# Patient Record
Sex: FEMALE | Race: WHITE | URBAN - METROPOLITAN AREA
[De-identification: names, ages, dates, MRNs, and addresses within clinical notes are randomized per-mention and may not be internally consistent; named-entity substitution may affect disease eponyms.]

---

## 2021-03-11 ENCOUNTER — OFFICE VISIT (OUTPATIENT)
Dept: PRIMARY CARE CLINIC | Age: 26
End: 2021-03-11
Payer: COMMERCIAL

## 2021-03-11 DIAGNOSIS — Z01.818 PREOP TESTING: Primary | ICD-10-CM

## 2021-03-11 DIAGNOSIS — Z11.59 SCREENING FOR VIRAL DISEASE: ICD-10-CM

## 2021-03-11 PROCEDURE — 99211 OFF/OP EST MAY X REQ PHY/QHP: CPT | Performed by: NURSE PRACTITIONER

## 2021-03-12 LAB — SARS-COV-2: NOT DETECTED

## 2021-03-18 ENCOUNTER — HOSPITAL ENCOUNTER (INPATIENT)
Age: 26
LOS: 2 days | Discharge: HOME | End: 2021-03-20
Attending: OBSTETRICS & GYNECOLOGY | Admitting: OBSTETRICS & GYNECOLOGY
Payer: COMMERCIAL

## 2021-03-18 PROBLEM — Z37.9 NORMAL LABOR: Status: ACTIVE | Noted: 2021-03-18

## 2021-03-18 PROBLEM — B95.1 GROUP BETA STREP POSITIVE: Status: ACTIVE | Noted: 2021-03-18

## 2021-03-18 LAB
ABO/RH: NORMAL
AMPHETAMINE SCREEN, URINE: NORMAL
ANTIBODY SCREEN: NORMAL
BARBITURATE SCREEN URINE: NORMAL
BASOPHILS ABSOLUTE: 0.1 K/UL (ref 0–0.2)
BASOPHILS RELATIVE PERCENT: 0.4 %
BENZODIAZEPINE SCREEN, URINE: NORMAL
BUPRENORPHINE URINE: NORMAL
CANNABINOID SCREEN URINE: NORMAL
COCAINE METABOLITE SCREEN URINE: NORMAL
EOSINOPHILS ABSOLUTE: 0 K/UL (ref 0–0.6)
EOSINOPHILS RELATIVE PERCENT: 0.1 %
HCT VFR BLD CALC: 40.5 % (ref 36–48)
HEMOGLOBIN: 13.8 G/DL (ref 12–16)
LYMPHOCYTES ABSOLUTE: 1.8 K/UL (ref 1–5.1)
LYMPHOCYTES RELATIVE PERCENT: 15 %
Lab: NORMAL
MCH RBC QN AUTO: 28.8 PG (ref 26–34)
MCHC RBC AUTO-ENTMCNC: 34.1 G/DL (ref 31–36)
MCV RBC AUTO: 84.4 FL (ref 80–100)
METHADONE SCREEN, URINE: NORMAL
MONOCYTES ABSOLUTE: 0.7 K/UL (ref 0–1.3)
MONOCYTES RELATIVE PERCENT: 5.3 %
NEUTROPHILS ABSOLUTE: 9.7 K/UL (ref 1.7–7.7)
NEUTROPHILS RELATIVE PERCENT: 79.2 %
OPIATE SCREEN URINE: NORMAL
OXYCODONE URINE: NORMAL
PDW BLD-RTO: 14.6 % (ref 12.4–15.4)
PH UA: 6
PHENCYCLIDINE SCREEN URINE: NORMAL
PLATELET # BLD: 191 K/UL (ref 135–450)
PMV BLD AUTO: 10.1 FL (ref 5–10.5)
PROPOXYPHENE SCREEN: NORMAL
RBC # BLD: 4.8 M/UL (ref 4–5.2)
WBC # BLD: 12.3 K/UL (ref 4–11)

## 2021-03-18 PROCEDURE — 2580000003 HC RX 258: Performed by: ADVANCED PRACTICE MIDWIFE

## 2021-03-18 PROCEDURE — 86850 RBC ANTIBODY SCREEN: CPT

## 2021-03-18 PROCEDURE — 0DQR0ZZ REPAIR ANAL SPHINCTER, OPEN APPROACH: ICD-10-PCS | Performed by: OBSTETRICS & GYNECOLOGY

## 2021-03-18 PROCEDURE — 86900 BLOOD TYPING SEROLOGIC ABO: CPT

## 2021-03-18 PROCEDURE — 80307 DRUG TEST PRSMV CHEM ANLYZR: CPT

## 2021-03-18 PROCEDURE — 1220000000 HC SEMI PRIVATE OB R&B

## 2021-03-18 PROCEDURE — 85025 COMPLETE CBC W/AUTO DIFF WBC: CPT

## 2021-03-18 PROCEDURE — 6370000000 HC RX 637 (ALT 250 FOR IP): Performed by: ADVANCED PRACTICE MIDWIFE

## 2021-03-18 PROCEDURE — 86901 BLOOD TYPING SEROLOGIC RH(D): CPT

## 2021-03-18 PROCEDURE — 7200000001 HC VAGINAL DELIVERY

## 2021-03-18 PROCEDURE — 86780 TREPONEMA PALLIDUM: CPT

## 2021-03-18 PROCEDURE — 6360000002 HC RX W HCPCS: Performed by: ADVANCED PRACTICE MIDWIFE

## 2021-03-18 RX ORDER — SODIUM CHLORIDE 0.9 % (FLUSH) 0.9 %
10 SYRINGE (ML) INJECTION EVERY 12 HOURS SCHEDULED
Status: DISCONTINUED | OUTPATIENT
Start: 2021-03-18 | End: 2021-03-18

## 2021-03-18 RX ORDER — DOCUSATE SODIUM 100 MG/1
100 CAPSULE, LIQUID FILLED ORAL 2 TIMES DAILY
Status: DISCONTINUED | OUTPATIENT
Start: 2021-03-18 | End: 2021-03-20 | Stop reason: HOSPADM

## 2021-03-18 RX ORDER — SODIUM CHLORIDE 0.9 % (FLUSH) 0.9 %
10 SYRINGE (ML) INJECTION PRN
Status: DISCONTINUED | OUTPATIENT
Start: 2021-03-18 | End: 2021-03-20 | Stop reason: HOSPADM

## 2021-03-18 RX ORDER — FERROUS SULFATE 325(65) MG
325 TABLET ORAL 2 TIMES DAILY WITH MEALS
Status: DISCONTINUED | OUTPATIENT
Start: 2021-03-18 | End: 2021-03-20 | Stop reason: HOSPADM

## 2021-03-18 RX ORDER — SODIUM CHLORIDE 0.9 % (FLUSH) 0.9 %
10 SYRINGE (ML) INJECTION EVERY 12 HOURS SCHEDULED
Status: DISCONTINUED | OUTPATIENT
Start: 2021-03-18 | End: 2021-03-20 | Stop reason: HOSPADM

## 2021-03-18 RX ORDER — ONDANSETRON 2 MG/ML
4 INJECTION INTRAMUSCULAR; INTRAVENOUS EVERY 6 HOURS PRN
Status: DISCONTINUED | OUTPATIENT
Start: 2021-03-18 | End: 2021-03-20 | Stop reason: HOSPADM

## 2021-03-18 RX ORDER — HYDROCODONE BITARTRATE AND ACETAMINOPHEN 5; 325 MG/1; MG/1
1 TABLET ORAL EVERY 4 HOURS PRN
Status: DISCONTINUED | OUTPATIENT
Start: 2021-03-18 | End: 2021-03-20 | Stop reason: HOSPADM

## 2021-03-18 RX ORDER — LIDOCAINE HYDROCHLORIDE 10 MG/ML
INJECTION, SOLUTION INFILTRATION; PERINEURAL
Status: DISCONTINUED
Start: 2021-03-18 | End: 2021-03-18

## 2021-03-18 RX ORDER — IBUPROFEN 800 MG/1
800 TABLET ORAL EVERY 6 HOURS PRN
Status: DISCONTINUED | OUTPATIENT
Start: 2021-03-18 | End: 2021-03-20 | Stop reason: HOSPADM

## 2021-03-18 RX ORDER — LANOLIN 100 %
OINTMENT (GRAM) TOPICAL PRN
Status: DISCONTINUED | OUTPATIENT
Start: 2021-03-18 | End: 2021-03-20 | Stop reason: HOSPADM

## 2021-03-18 RX ORDER — SODIUM CHLORIDE, SODIUM LACTATE, POTASSIUM CHLORIDE, CALCIUM CHLORIDE 600; 310; 30; 20 MG/100ML; MG/100ML; MG/100ML; MG/100ML
INJECTION, SOLUTION INTRAVENOUS CONTINUOUS
Status: DISCONTINUED | OUTPATIENT
Start: 2021-03-18 | End: 2021-03-20 | Stop reason: HOSPADM

## 2021-03-18 RX ORDER — ACETAMINOPHEN 325 MG/1
650 TABLET ORAL EVERY 4 HOURS PRN
Status: DISCONTINUED | OUTPATIENT
Start: 2021-03-18 | End: 2021-03-20 | Stop reason: HOSPADM

## 2021-03-18 RX ORDER — CALCIUM CARBONATE 500(1250)
500 TABLET ORAL DAILY
COMMUNITY

## 2021-03-18 RX ORDER — PENICILLIN G POTASSIUM 5000000 [IU]/1
INJECTION, POWDER, FOR SOLUTION INTRAMUSCULAR; INTRAVENOUS
Status: DISCONTINUED
Start: 2021-03-18 | End: 2021-03-18

## 2021-03-18 RX ADMIN — DEXTROSE MONOHYDRATE 5 MILLION UNITS: 5 INJECTION INTRAVENOUS at 10:20

## 2021-03-18 RX ADMIN — IBUPROFEN 800 MG: 800 TABLET, FILM COATED ORAL at 20:00

## 2021-03-18 RX ADMIN — WITCH HAZEL 40 EACH: 500 SOLUTION RECTAL; TOPICAL at 19:56

## 2021-03-18 RX ADMIN — Medication 10 ML: at 19:57

## 2021-03-18 RX ADMIN — BENZOCAINE AND LEVOMENTHOL: 200; 5 SPRAY TOPICAL at 19:56

## 2021-03-18 RX ADMIN — Medication 2.5 MILLION UNITS: at 13:54

## 2021-03-18 RX ADMIN — SODIUM CHLORIDE, POTASSIUM CHLORIDE, SODIUM LACTATE AND CALCIUM CHLORIDE: 600; 310; 30; 20 INJECTION, SOLUTION INTRAVENOUS at 10:10

## 2021-03-18 RX ADMIN — DOCUSATE SODIUM 100 MG: 100 CAPSULE, LIQUID FILLED ORAL at 19:56

## 2021-03-18 NOTE — H&P
Department of Obstetrics and Gynecology   Obstetrics History and Physical        CHIEF COMPLAINT:  Admitted w/ onset of labor. Reports onset of regular contractions at 0430. Plans NCB.  at bedside. Denies fevers, SOB, coughs, known Covid exposures. Covid testing Negative on 3/11/2021    HISTORY OF PRESENT ILLNESS:      The patient is a 27 y.o. female at 39w0d. OB History        1    Para        Term                AB        Living           SAB        TAB        Ectopic        Molar        Multiple        Live Births                Patient presents with a chief complaint as above and is being admitted for active phase labor    Estimated Due Date: Estimated Date of Delivery: 3/25/21    PRENATAL CARE:    Complicated by: none    PAST OB HISTORY:  OB History        1    Para        Term                AB        Living           SAB        TAB        Ectopic        Molar        Multiple        Live Births                    Past Medical History:        Diagnosis Date    Asthma     childhood     Past Surgical History:        Procedure Laterality Date    KNEE SURGERY Left     TONSILLECTOMY      WISDOM TOOTH EXTRACTION       Allergies:  Patient has no known allergies.     Social History:    Social History     Socioeconomic History    Marital status:      Spouse name: Not on file    Number of children: Not on file    Years of education: Not on file    Highest education level: Not on file   Occupational History    Not on file   Social Needs    Financial resource strain: Not on file    Food insecurity     Worry: Not on file     Inability: Not on file   [employer] needs     Medical: Not on file     Non-medical: Not on file   Tobacco Use    Smoking status: Never Smoker    Smokeless tobacco: Never Used   Substance and Sexual Activity    Alcohol use: Not Currently    Drug use: Never    Sexual activity: Yes     Partners: Male   Lifestyle    Physical activity     Days per week: Not on file     Minutes per session: Not on file    Stress: Not on file   Relationships    Social connections     Talks on phone: Not on file     Gets together: Not on file     Attends Hindu service: Not on file     Active member of club or organization: Not on file     Attends meetings of clubs or organizations: Not on file     Relationship status: Not on file    Intimate partner violence     Fear of current or ex partner: Not on file     Emotionally abused: Not on file     Physically abused: Not on file     Forced sexual activity: Not on file   Other Topics Concern    Not on file   Social History Narrative    Not on file     Family History:   History reviewed. No pertinent family history. Medications Prior to Admission:  Medications Prior to Admission: Prenatal Vit-Fe Fumarate-FA (PRENATAL 1+1 PO), Take 1 tablet by mouth daily  calcium carbonate (OSCAL) 500 MG TABS tablet, Take 500 mg by mouth daily    REVIEW OF SYSTEMS:    As per HPI    PHYSICAL EXAM:  Vitals:    03/18/21 0734   BP: 133/79   Pulse: 86   Resp: 16   Temp: 98.2 °F (36.8 °C)   TempSrc: Infrared   Weight: 184 lb (83.5 kg)   Height: 5' 5\" (1.651 m)     General appearance:  awake, alert, cooperative, no apparent distress, and appears stated age  Neurologic:  Awake, alert, oriented to name, place and time. Lungs:  No increased work of breathing, good air exchange  Abdomen:  Soft, non tender, gravid, consistent with her gestational age, EFW by Leopold's maneuver was 7#   Fetal heart rate:  Reassuring.  130 moderate variability, Accels present  Pelvis:  Adequate pelvis  Cervix: 4/70/-2 on admit, 5-6/100/-1 at 0930 per CNM exam  Contraction frequency:  q 2-3 minutes  Membranes:  Intact/Bulging    Labs:   CBC with Differential:    Lab Results   Component Value Date    WBC 12.3 03/18/2021    RBC 4.80 03/18/2021    HGB 13.8 03/18/2021    HCT 40.5 03/18/2021     03/18/2021    MCV 84.4 03/18/2021    MCH 28.8 03/18/2021    MCHC 34.1

## 2021-03-18 NOTE — PLAN OF CARE
Problem: Pain:  Goal: Pain level will decrease  Description: Pain level will decrease  3/18/2021 1931 by Charisse Reyes RN  Outcome: Completed  3/18/2021 1117 by Charisse Reyes RN  Outcome: Ongoing  Goal: Control of acute pain  Description: Control of acute pain  3/18/2021 1931 by Charisse Reyes RN  Outcome: Completed  3/18/2021 1117 by Charisse Reyes RN  Outcome: Ongoing  Goal: Control of chronic pain  Description: Control of chronic pain  3/18/2021 1931 by Charisse Reyes RN  Outcome: Completed  3/18/2021 1117 by Charisse Reyes RN  Outcome: Ongoing     Problem: Anxiety:  Goal: Level of anxiety will decrease  Description: Level of anxiety will decrease  3/18/2021 1931 by Charisse Reyes RN  Outcome: Completed  3/18/2021 1117 by Charisse Reyes RN  Outcome: Ongoing     Problem: Breathing Pattern - Ineffective:  Goal: Able to breathe comfortably  Description: Able to breathe comfortably  3/18/2021 1931 by Charisse Reyes RN  Outcome: Completed  3/18/2021 1117 by Charisse Reyes RN  Outcome: Ongoing     Problem: Fluid Volume - Imbalance:  Goal: Absence of imbalanced fluid volume signs and symptoms  Description: Absence of imbalanced fluid volume signs and symptoms  3/18/2021 1931 by Charisse Reyes RN  Outcome: Completed  3/18/2021 1117 by Charisse Reyes RN  Outcome: Ongoing  Goal: Absence of intrapartum hemorrhage signs and symptoms  Description: Absence of intrapartum hemorrhage signs and symptoms  3/18/2021 1931 by Charisse Reyes RN  Outcome: Completed  3/18/2021 1117 by Charisse Reyes RN  Outcome: Ongoing     Problem: Infection - Intrapartum Infection:  Goal: Will show no infection signs and symptoms  Description: Will show no infection signs and symptoms  3/18/2021 1931 by Charisse Reyes RN  Outcome: Completed  3/18/2021 1117 by Charisse Reyes RN  Outcome: Ongoing     Problem: Labor Process - Prolonged:  Goal: Labor progression, first stage, within specified pattern  Description: Labor progression, first stage, within specified pattern  3/18/2021 1931 by Tho Garcia RN  Outcome: Completed  3/18/2021 111 by Tho Garcia RN  Outcome: Ongoing  Goal: Labor progession, second stage, within specified pattern  Description: Labor progession, second stage, within specified pattern  3/18/2021 1931 by Tho Garcia RN  Outcome: Completed  3/18/2021 111 by Tho Garcia RN  Outcome: Ongoing  Goal: Uterine contractions within specified parameters  Description: Uterine contractions within specified parameters  3/18/2021 1931 by Tho Garcia RN  Outcome: Completed  3/18/2021 111 by Tho Garcia RN  Outcome: Ongoing     Problem:  Screening:  Goal: Ability to make informed decisions regarding treatment has improved  Description: Ability to make informed decisions regarding treatment has improved  3/18/2021 1931 by Tho Garcia RN  Outcome: Completed  3/18/2021 1117 by Tho Garcia RN  Outcome: Ongoing     Problem: Pain - Acute:  Goal: Pain level will decrease  Description: Pain level will decrease  3/18/2021 1931 by Tho Garcia RN  Outcome: Completed  3/18/2021 111 by Tho Garcia RN  Outcome: Ongoing  Goal: Able to cope with pain  Description: Able to cope with pain  3/18/2021 1931 by Tho Garcia RN  Outcome: Completed  3/18/2021 1117 by Tho Garcia RN  Outcome: Ongoing     Problem: Tissue Perfusion - Uteroplacental, Altered:  Goal: Absence of abnormal fetal heart rate pattern  Description: Absence of abnormal fetal heart rate pattern  3/18/2021 1931 by Tho Garcia RN  Outcome: Completed  3/18/2021 111 by Tho Garcia RN  Outcome: Ongoing     Problem: Urinary Retention:  Goal: Experiences of bladder distention will decrease  Description: Experiences of bladder distention will decrease  3/18/2021 1931 by Tho Garcia RN  Outcome: Completed  3/18/2021 111 by Annalisa Shannon Bonny oLndon RN  Outcome: Ongoing  Goal: Urinary elimination within specified parameters  Description: Urinary elimination within specified parameters  3/18/2021 1931 by Dorina Moran RN  Outcome: Completed  3/18/2021 1117 by Dorina Moran RN  Outcome: Ongoing

## 2021-03-19 LAB
BASOPHILS ABSOLUTE: 0 K/UL (ref 0–0.2)
BASOPHILS RELATIVE PERCENT: 0.1 %
EOSINOPHILS ABSOLUTE: 0 K/UL (ref 0–0.6)
EOSINOPHILS RELATIVE PERCENT: 0 %
HCT VFR BLD CALC: 38.3 % (ref 36–48)
HEMOGLOBIN: 12.8 G/DL (ref 12–16)
LYMPHOCYTES ABSOLUTE: 1.7 K/UL (ref 1–5.1)
LYMPHOCYTES RELATIVE PERCENT: 11.5 %
MCH RBC QN AUTO: 28.7 PG (ref 26–34)
MCHC RBC AUTO-ENTMCNC: 33.3 G/DL (ref 31–36)
MCV RBC AUTO: 86.2 FL (ref 80–100)
MONOCYTES ABSOLUTE: 1.2 K/UL (ref 0–1.3)
MONOCYTES RELATIVE PERCENT: 7.7 %
NEUTROPHILS ABSOLUTE: 12.2 K/UL (ref 1.7–7.7)
NEUTROPHILS RELATIVE PERCENT: 80.7 %
PDW BLD-RTO: 14.3 % (ref 12.4–15.4)
PLATELET # BLD: 177 K/UL (ref 135–450)
PMV BLD AUTO: 9.9 FL (ref 5–10.5)
RBC # BLD: 4.45 M/UL (ref 4–5.2)
TOTAL SYPHILLIS IGG/IGM: NORMAL
WBC # BLD: 15.2 K/UL (ref 4–11)

## 2021-03-19 PROCEDURE — 36415 COLL VENOUS BLD VENIPUNCTURE: CPT

## 2021-03-19 PROCEDURE — 1220000000 HC SEMI PRIVATE OB R&B

## 2021-03-19 PROCEDURE — 6370000000 HC RX 637 (ALT 250 FOR IP): Performed by: ADVANCED PRACTICE MIDWIFE

## 2021-03-19 PROCEDURE — 85025 COMPLETE CBC W/AUTO DIFF WBC: CPT

## 2021-03-19 RX ADMIN — ACETAMINOPHEN 650 MG: 325 TABLET ORAL at 08:39

## 2021-03-19 RX ADMIN — IBUPROFEN 800 MG: 800 TABLET, FILM COATED ORAL at 14:02

## 2021-03-19 RX ADMIN — IBUPROFEN 800 MG: 800 TABLET, FILM COATED ORAL at 20:45

## 2021-03-19 RX ADMIN — ACETAMINOPHEN 650 MG: 325 TABLET ORAL at 18:40

## 2021-03-19 RX ADMIN — DOCUSATE SODIUM 100 MG: 100 CAPSULE, LIQUID FILLED ORAL at 08:39

## 2021-03-19 RX ADMIN — ACETAMINOPHEN 650 MG: 325 TABLET ORAL at 14:01

## 2021-03-19 RX ADMIN — IBUPROFEN 800 MG: 800 TABLET, FILM COATED ORAL at 06:21

## 2021-03-19 ASSESSMENT — PAIN SCALES - GENERAL
PAINLEVEL_OUTOF10: 2
PAINLEVEL_OUTOF10: 1

## 2021-03-19 NOTE — PLAN OF CARE
RN  Outcome: Ongoing     Problem: Breathing Pattern - Ineffective:  Goal: Able to breathe comfortably  Description: Able to breathe comfortably  3/18/2021 1931 by Phil Regalado RN  Outcome: Completed  3/18/2021 1117 by Phil Regalado RN  Outcome: Ongoing     Problem: Fluid Volume - Imbalance:  Goal: Absence of imbalanced fluid volume signs and symptoms  Description: Absence of imbalanced fluid volume signs and symptoms  3/18/2021 1931 by Phil Regalado RN  Outcome: Completed  3/18/2021 1117 by Phil Regalado RN  Outcome: Ongoing  Goal: Absence of intrapartum hemorrhage signs and symptoms  Description: Absence of intrapartum hemorrhage signs and symptoms  3/18/2021 1931 by Phil Regalado RN  Outcome: Completed  3/18/2021 1117 by Phil Regalado RN  Outcome: Ongoing     Problem: Infection - Intrapartum Infection:  Goal: Will show no infection signs and symptoms  Description: Will show no infection signs and symptoms  3/18/2021 1931 by Phil Regalado RN  Outcome: Completed  3/18/2021 1117 by Phil Regalado RN  Outcome: Ongoing     Problem: Labor Process - Prolonged:  Goal: Labor progression, first stage, within specified pattern  Description: Labor progression, first stage, within specified pattern  3/18/2021 1931 by Phil Regalado RN  Outcome: Completed  3/18/2021 1117 by Phil Regalado RN  Outcome: Ongoing  Goal: Labor progession, second stage, within specified pattern  Description: Labor progession, second stage, within specified pattern  3/18/2021 1931 by Phil Regalado RN  Outcome: Completed  3/18/2021 1117 by Phil Regalado RN  Outcome: Ongoing  Goal: Uterine contractions within specified parameters  Description: Uterine contractions within specified parameters  3/18/2021 1931 by Phil Regalado RN  Outcome: Completed  3/18/2021 1117 by Phil Regalado RN  Outcome: Ongoing     Problem:  Screening:  Goal: Ability to make informed decisions regarding treatment has improved  Description: Ability to make informed decisions regarding treatment has improved  3/18/2021 1931 by Pamella Cole RN  Outcome: Completed  3/18/2021 1117 by Pamella Cole RN  Outcome: Ongoing     Problem: Pain - Acute:  Goal: Pain level will decrease  Description: Pain level will decrease  3/18/2021 1931 by Pamella Cole RN  Outcome: Completed  3/18/2021 1117 by Pamella Cole RN  Outcome: Ongoing  Goal: Able to cope with pain  Description: Able to cope with pain  3/18/2021 1931 by Pamella Cole RN  Outcome: Completed  3/18/2021 1117 by Pamella Cole RN  Outcome: Ongoing     Problem: Tissue Perfusion - Uteroplacental, Altered:  Goal: Absence of abnormal fetal heart rate pattern  Description: Absence of abnormal fetal heart rate pattern  3/18/2021 1931 by Pamella Cole RN  Outcome: Completed  3/18/2021 1117 by Pamella Cole RN  Outcome: Ongoing     Problem: Urinary Retention:  Goal: Experiences of bladder distention will decrease  Description: Experiences of bladder distention will decrease  3/18/2021 1931 by Pamella Cole RN  Outcome: Completed  3/18/2021 1117 by Pamella Cole RN  Outcome: Ongoing  Goal: Urinary elimination within specified parameters  Description: Urinary elimination within specified parameters  3/18/2021 1931 by Pamella Cole RN  Outcome: Completed  3/18/2021 1117 by Pamella Cole RN  Outcome: Ongoing

## 2021-03-19 NOTE — PLAN OF CARE
Problem: VAGINAL DELIVERY - RECOVERY AND POST PARTUM  Goal: Vital signs are medically acceptable  3/19/2021 0742 by Catherine Caldwell RN  Outcome: Ongoing  3/19/2021 0000 by Delfino Melara RN  Outcome: Ongoing  Goal: Patient will remain free of falls  3/19/2021 0742 by Catherine Caldwell RN  Outcome: Ongoing  3/19/2021 0000 by Delfino Melara RN  Outcome: Ongoing  Goal: Fundus firm at midline  3/19/2021 0742 by Catherine Caldwell RN  Outcome: Ongoing  3/19/2021 0000 by Delfino Melara RN  Outcome: Ongoing  Goal: Moderate rubra without clots, no purulent discharge, no foul smelling lochia  3/19/2021 0742 by Catherine Caldwell RN  Outcome: Ongoing  3/19/2021 0000 by Delfino Melara RN  Outcome: Ongoing  Goal: Empties bladder  3/19/2021 0742 by Catherine Caldwell RN  Outcome: Ongoing  3/19/2021 0000 by Delfino Melara RN  Outcome: Ongoing  Goal: Verbalizes understanding of normal bowel function resumption  3/19/2021 0742 by Catherine Caldwell RN  Outcome: Ongoing  3/19/2021 0000 by Delfino Melara RN  Outcome: Ongoing  Goal: Edema will be absent or minimal  3/19/2021 0742 by Catherine Caldwell RN  Outcome: Ongoing  3/19/2021 0000 by Delfino Melara RN  Outcome: Ongoing  Goal: Breasts are soft with nipple integrity intact  3/19/2021 0742 by Catherine Caldwell RN  Outcome: Ongoing  3/19/2021 0000 by Delfino Melara RN  Outcome: Ongoing  Goal: Demonstrates appropriate breast feeding techniques  3/19/2021 0742 by Catherine Caldwell RN  Outcome: Ongoing  3/19/2021 0000 by Delfino Melara RN  Outcome: Ongoing  Goal: Appropriate behavior observed  3/19/2021 0742 by Catherine Caldwell RN  Outcome: Ongoing  3/19/2021 0000 by Delfino Melara RN  Outcome: Ongoing  Goal: Positive Mother-Baby interactions are observed  3/19/2021 0742 by Catherine Caldwell RN  Outcome: Ongoing  3/19/2021 0000 by Delfino Melara RN  Outcome: Ongoing  Goal: Perineum intact without discharge or hematoma  3/19/2021 0742 by Catherine Caldwell, RN  Outcome: Ongoing  3/19/2021 0000 by Shiloh Hardin RN  Outcome: Ongoing  Goal: Ambulates independently  3/19/2021 8675 by Barbi Shay RN  Outcome: Ongoing  3/19/2021 0000 by Shiloh Hardin RN  Outcome: Ongoing     Problem: PAIN  Goal: Patient's pain/discomfort is manageable  3/19/2021 0742 by Barbi Shay RN  Outcome: Ongoing  3/19/2021 0000 by Shiloh Hardin RN  Outcome: Ongoing     Problem: KNOWLEDGE DEFICIT  Goal: Patient/S.O. demonstrates understanding of disease process, treatment plan, medications, and discharge instructions.   3/19/2021 0742 by Barbi Shay RN  Outcome: Ongoing  3/19/2021 0000 by Shiloh Hardin RN  Outcome: Ongoing

## 2021-03-19 NOTE — L&D DELIVERY SUMMARY NOTE
[address] [address]                            LABOR AND DELIVERY NOTE    PATIENT NAME: Sonia Malone                       :        1995  MED REC NO:   [id number]                          ROOM:       [id number]  ACCOUNT NO:   [de-identified]                           ADMIT DATE: 2021  PROVIDER:     Damaso Krause CNM    DATE OF PROCEDURE:  2021    DELIVERY SUMMARY    The patient is a 17-year-old  1, para 0, at 44 weeks gestation  who was admitted on 2021 in active labor at term. On admission, she was 4 cm dilated, 70% effaced, -2 station with a  cephalic presentation. Her fetal heart tones were category I.  Her  pregnancy course was uncomplicated. She was GBS positive and did  receive two doses of antibiotics prior to delivery. The patient desired  natural childbirth. She labored effectively, became complete and started to push. Membranes  ruptured spontaneously as the patient became complete for a moderate  amount of clear fluid. She pushed for approximately 1.5 hours to   and the rest of the infant delivered atraumatically and was  placed on the mother's abdomen. The infant established good color, tone  and cry at delivery. The cord was doubly clamped and cut and the infant was placed  skin-to-skin with the mother and there was a posterior compound  presenting hand. The placenta delivered spontaneously and was inspected  and found to be intact. The uterus contracted promptly and the patient  did receive oxytocin and the IV fluids. EBL was 250 mL. The perineum and vagina were explored and a third-degree laceration was  noted. Dr. Rosie Chandra was called to the bedside and performed the repair  with 2-0 and 3-0 suture under local infiltration in the usual fashion. See separate MD note for repair. This mother plans to breastfeed her baby.   Mother, baby and father  entered

## 2021-03-19 NOTE — LACTATION NOTE
Lactation Progress Note      Data:  F/U on 1/0 breast feeder. Mob states that baby is feeding well. States that latch is uncomfortable initially but better after about 1 minute. States that baby just fed. Action: Breast feeding education reviewed. Stressed importance of always achieving a good deep latch and offered LC support as needed. [facility] number on board for f/u. Response: Verbalized understanding and comfortable with breast feeding at this time.

## 2021-03-19 NOTE — PROGRESS NOTES
Breathing well with contractions, second dose of antibiotics in  Afebrile,VSS   per doppler  Contractions q 2 minutes  Cx C/C/0 SROM w/ clear fluid at exam  Continue plan of care  Begin pushing   Anticipate vaginal delivery
Breathing well with contractions. Up walking in room  Afebrile, VSS  -134 per intermittent ascultation.  No decelerations during ascultation  Contractions q 2 minutes, palpate firm  Cx deferred  Continue plan of care  Labor support  Plan cx exam after second dose of antibiotics or prn
Called by BEN Elliott to evaluate laceration  Noted to have Third degree laceration  Local lidocaine injected   2 O and 3 O Vicryl (x2) used to repair   Post repair, rectal exam performed. No suture palpated, rectal mucosa intact.
Department of Obstetrics and Gynecology  Labor and Delivery  Attending Post Partum Progress Note      SUBJECTIVE:  Feeling well, ambulating w/out problems. Voiding and bleeding WNL. Pain well-controlled w/PO Motrin. Breastfeeding infant male. Desires circ.     OBJECTIVE:      Vitals:  /61   Pulse 77   Temp 97.7 °F (36.5 °C) (Axillary)   Resp 18   Ht 5' 5\" (1.651 m)   Wt 184 lb (83.5 kg)   Breastfeeding Unknown   BMI 30.62 kg/m²       DATA:    CBC:    Lab Results   Component Value Date    WBC 15.2 03/19/2021    RBC 4.45 03/19/2021    HGB 12.8 03/19/2021    HCT 38.3 03/19/2021    MCV 86.2 03/19/2021    RDW 14.3 03/19/2021     03/19/2021       ASSESSMENT & PLAN:      Normal PP day 1  Lactating  Stable infant male    Comfort and teaching  Continue PP care  Probable D/C tomorrow
Per order from [address] FHR monitoring allowed to be intermittent with Doppler.
Pt assisted by 2 staff members to restroom for first time get up. Pt denies dizziness or lightheadedness. Gait steady. Pt voided urine without difficulty, unable to measure. Pericare done by pt with RN instruction. New ice pack, pad and panties put on pt. Gown changed. Hand hygiene done. Complete linen change done and comfort pad put on bed. Pt tolerated well.
Viable male infant delivered via spontaneous vaginal delivery natural childbirth, performed by Chloe Sosa CNM at this time
4

## 2021-03-20 VITALS
SYSTOLIC BLOOD PRESSURE: 110 MMHG | HEART RATE: 77 BPM | RESPIRATION RATE: 18 BRPM | WEIGHT: 184 LBS | DIASTOLIC BLOOD PRESSURE: 70 MMHG | HEIGHT: 65 IN | BODY MASS INDEX: 30.66 KG/M2 | TEMPERATURE: 97.8 F

## 2021-03-20 PROBLEM — Z37.9 NORMAL LABOR: Status: RESOLVED | Noted: 2021-03-18 | Resolved: 2021-03-20

## 2021-03-20 PROBLEM — B95.1 GROUP BETA STREP POSITIVE: Status: RESOLVED | Noted: 2021-03-18 | Resolved: 2021-03-20

## 2021-03-20 PROBLEM — Z37.9 NORMAL LABOR: Status: ACTIVE | Noted: 2021-03-20

## 2021-03-20 PROCEDURE — 6370000000 HC RX 637 (ALT 250 FOR IP): Performed by: ADVANCED PRACTICE MIDWIFE

## 2021-03-20 RX ORDER — IBUPROFEN 800 MG/1
800 TABLET ORAL EVERY 6 HOURS PRN
Qty: 120 TABLET | Refills: 3 | COMMUNITY
Start: 2021-03-20

## 2021-03-20 RX ORDER — LANOLIN 100 %
OINTMENT (GRAM) TOPICAL
Qty: 1 TUBE | Refills: 3 | COMMUNITY
Start: 2021-03-20

## 2021-03-20 RX ORDER — PSEUDOEPHEDRINE HCL 30 MG
100 TABLET ORAL 2 TIMES DAILY
COMMUNITY
Start: 2021-03-20

## 2021-03-20 RX ADMIN — DOCUSATE SODIUM 100 MG: 100 CAPSULE, LIQUID FILLED ORAL at 07:40

## 2021-03-20 RX ADMIN — DOCUSATE SODIUM 100 MG: 100 CAPSULE, LIQUID FILLED ORAL at 14:53

## 2021-03-20 RX ADMIN — ACETAMINOPHEN 650 MG: 325 TABLET ORAL at 15:20

## 2021-03-20 RX ADMIN — ACETAMINOPHEN 650 MG: 325 TABLET ORAL at 07:41

## 2021-03-20 RX ADMIN — ACETAMINOPHEN 650 MG: 325 TABLET ORAL at 03:08

## 2021-03-20 RX ADMIN — IBUPROFEN 800 MG: 800 TABLET, FILM COATED ORAL at 14:52

## 2021-03-20 ASSESSMENT — PAIN SCALES - GENERAL
PAINLEVEL_OUTOF10: 2

## 2021-03-20 NOTE — FLOWSHEET NOTE
Patient ambulatory to room 382 for admission for labor. Patient and family oriented to room. Call light explained and provided. EFM applied with consent to central monitor bank with alarms on. Uterus soft and non-tender. Admission history obtained, laboratory results reviewed and appropriate consents signed/reviewed. Reviewed  safety and security, initial care of the  and medications given at delivery. Questions and concerns addressed/answered.
Postpartum and infant care teaching completed and forms signed by patient. Copy witnessed by RN and given to patient who verbalized understanding of all teaching points and denies further questions. Patient plans to follow-up with Lisa Padilla Provider as instructed. ID bands checked. Mother's ID band and one of baby's ID bands removed and taped to discharge instruction sheet, signed by patient and witnessed by RN. Patient discharged home in stable condition accompanied by the fob. Discharged via wheelchair, holding baby in a rear facing car seat.
__________________________  13. Do you have any other questions or concerns I can address today?  Y/N  __________________________________________________      Teaching During interview :_____________________________________________  ___________________________RN       Date:______________Time:________________

## 2021-03-20 NOTE — LACTATION NOTE
This note was copied from a baby's chart. Introduced self as lactation RN for this shift. Name and number written on board. MOB states that her left nipple has started to feel sore during entire feed and not just the first 30 seconds to a minute. Denies any blisters or redness but she did notice that her nipple was flattened when baby came off that side last feeding. MOB plans to call when infant is due to eat again to assess nipple and latch. Will follow up.

## 2021-03-20 NOTE — LACTATION NOTE
This note was copied from a baby's chart. Lactation Progress Note      Data:     F/u during lactation rounds with primip breast feeder, 2 po who desires to be discharged home later today. C/o sore nipples. Infant at 6.4% weight loss. Action: Discussed possible causes of sore nipples including shallow latch. Educated on importance of ARUNA, and how a good latch should look and feel. Encouraged to call for [facility] to assess latch with next feeding and will review discharge breast feeding education at this as well. Educated on inpatient and outpatient lactation support services available, and how to contact. Response: Verbalized understanding and plans to call for [address] [facility] [place] to assess latch and review discharge teaching before discharge home.

## 2023-10-02 LAB
C. TRACHOMATIS, EXTERNAL RESULT: NEGATIVE
N. GONORRHOEAE, EXTERNAL RESULT: NEGATIVE

## 2023-10-07 LAB
ABO, EXTERNAL RESULT: NORMAL
HEP B, EXTERNAL RESULT: NEGATIVE
HIV, EXTERNAL RESULT: NEGATIVE
RH FACTOR, EXTERNAL RESULT: POSITIVE
RPR, EXTERNAL RESULT: NON REACTIVE
RUBELLA TITER, EXTERNAL RESULT: NORMAL

## 2023-10-11 LAB — GBS, EXTERNAL RESULT: POSITIVE

## 2024-05-04 ENCOUNTER — HOSPITAL ENCOUNTER (INPATIENT)
Age: 29
LOS: 2 days | Discharge: HOME | End: 2024-05-06
Attending: OBSTETRICS & GYNECOLOGY | Admitting: OBSTETRICS & GYNECOLOGY
Payer: COMMERCIAL

## 2024-05-04 LAB
ABO + RH BLD: NORMAL
AMPHETAMINES UR QL SCN>1000 NG/ML: NORMAL
BARBITURATES UR QL SCN>200 NG/ML: NORMAL
BASOPHILS # BLD: 0.1 K/UL (ref 0–0.2)
BASOPHILS NFR BLD: 0.5 %
BENZODIAZ UR QL SCN>200 NG/ML: NORMAL
BLD GP AB SCN SERPL QL: NORMAL
BUPRENORPHINE+NOR UR QL SCN: NORMAL
CANNABINOIDS UR QL SCN>50 NG/ML: NORMAL
COCAINE UR QL SCN: NORMAL
DEPRECATED RDW RBC AUTO: 14.9 % (ref 12.4–15.4)
DRUG SCREEN COMMENT UR-IMP: NORMAL
EOSINOPHIL # BLD: 0 K/UL (ref 0–0.6)
EOSINOPHIL NFR BLD: 0.2 %
FENTANYL SCREEN, URINE: NORMAL
HCT VFR BLD AUTO: 35.2 % (ref 36–48)
HGB BLD-MCNC: 11.8 G/DL (ref 12–16)
LYMPHOCYTES # BLD: 1.9 K/UL (ref 1–5.1)
LYMPHOCYTES NFR BLD: 19.2 %
MCH RBC QN AUTO: 27.3 PG (ref 26–34)
MCHC RBC AUTO-ENTMCNC: 33.7 G/DL (ref 31–36)
MCV RBC AUTO: 80.9 FL (ref 80–100)
METHADONE UR QL SCN>300 NG/ML: NORMAL
MONOCYTES # BLD: 0.5 K/UL (ref 0–1.3)
MONOCYTES NFR BLD: 5.2 %
NEUTROPHILS # BLD: 7.6 K/UL (ref 1.7–7.7)
OPIATES UR QL SCN>300 NG/ML: NORMAL
OXYCODONE UR QL SCN: NORMAL
PCP UR QL SCN>25 NG/ML: NORMAL
PH UR STRIP: 6 [PH]
PLATELET # BLD AUTO: 184 K/UL (ref 135–450)
PMV BLD AUTO: 9.7 FL (ref 5–10.5)
RBC # BLD AUTO: 4.35 M/UL (ref 4–5.2)
WBC # BLD AUTO: 10.1 K/UL (ref 4–11)

## 2024-05-04 PROCEDURE — 86901 BLOOD TYPING SEROLOGIC RH(D): CPT

## 2024-05-04 PROCEDURE — 80307 DRUG TEST PRSMV CHEM ANLYZR: CPT

## 2024-05-04 PROCEDURE — 86780 TREPONEMA PALLIDUM: CPT

## 2024-05-04 PROCEDURE — 85025 COMPLETE CBC W/AUTO DIFF WBC: CPT

## 2024-05-04 PROCEDURE — 86900 BLOOD TYPING SEROLOGIC ABO: CPT

## 2024-05-04 PROCEDURE — 6360000002 HC RX W HCPCS: Performed by: ADVANCED PRACTICE MIDWIFE

## 2024-05-04 PROCEDURE — 86850 RBC ANTIBODY SCREEN: CPT

## 2024-05-04 PROCEDURE — 1220000000 HC SEMI PRIVATE OB R&B

## 2024-05-04 PROCEDURE — 2580000003 HC RX 258: Performed by: ADVANCED PRACTICE MIDWIFE

## 2024-05-04 RX ORDER — SODIUM CHLORIDE 0.9 % (FLUSH) 0.9 %
5-40 SYRINGE (ML) INJECTION EVERY 12 HOURS SCHEDULED
Status: DISCONTINUED | OUTPATIENT
Start: 2024-05-04 | End: 2024-05-05

## 2024-05-04 RX ORDER — SODIUM CHLORIDE, SODIUM LACTATE, POTASSIUM CHLORIDE, AND CALCIUM CHLORIDE .6; .31; .03; .02 G/100ML; G/100ML; G/100ML; G/100ML
500 INJECTION, SOLUTION INTRAVENOUS PRN
Status: DISCONTINUED | OUTPATIENT
Start: 2024-05-04 | End: 2024-05-05

## 2024-05-04 RX ORDER — SODIUM CHLORIDE 9 MG/ML
25 INJECTION, SOLUTION INTRAVENOUS PRN
Status: DISCONTINUED | OUTPATIENT
Start: 2024-05-04 | End: 2024-05-05

## 2024-05-04 RX ORDER — SODIUM CHLORIDE, SODIUM LACTATE, POTASSIUM CHLORIDE, CALCIUM CHLORIDE 600; 310; 30; 20 MG/100ML; MG/100ML; MG/100ML; MG/100ML
INJECTION, SOLUTION INTRAVENOUS CONTINUOUS
Status: DISCONTINUED | OUTPATIENT
Start: 2024-05-04 | End: 2024-05-05

## 2024-05-04 RX ORDER — ONDANSETRON 2 MG/ML
4 INJECTION INTRAMUSCULAR; INTRAVENOUS EVERY 6 HOURS PRN
Status: DISCONTINUED | OUTPATIENT
Start: 2024-05-04 | End: 2024-05-05

## 2024-05-04 RX ORDER — SODIUM CHLORIDE, SODIUM LACTATE, POTASSIUM CHLORIDE, AND CALCIUM CHLORIDE .6; .31; .03; .02 G/100ML; G/100ML; G/100ML; G/100ML
1000 INJECTION, SOLUTION INTRAVENOUS PRN
Status: DISCONTINUED | OUTPATIENT
Start: 2024-05-04 | End: 2024-05-05

## 2024-05-04 RX ORDER — CARBOPROST TROMETHAMINE 250 UG/ML
250 INJECTION, SOLUTION INTRAMUSCULAR PRN
Status: DISCONTINUED | OUTPATIENT
Start: 2024-05-04 | End: 2024-05-05

## 2024-05-04 RX ORDER — ONDANSETRON 4 MG/1
4 TABLET, ORALLY DISINTEGRATING ORAL EVERY 6 HOURS PRN
Status: DISCONTINUED | OUTPATIENT
Start: 2024-05-04 | End: 2024-05-05

## 2024-05-04 RX ORDER — MISOPROSTOL 100 UG/1
400 TABLET ORAL PRN
Status: DISCONTINUED | OUTPATIENT
Start: 2024-05-04 | End: 2024-05-05

## 2024-05-04 RX ORDER — ACETAMINOPHEN 325 MG/1
650 TABLET ORAL EVERY 4 HOURS PRN
Status: DISCONTINUED | OUTPATIENT
Start: 2024-05-04 | End: 2024-05-05

## 2024-05-04 RX ORDER — SODIUM CHLORIDE, SODIUM LACTATE, POTASSIUM CHLORIDE, CALCIUM CHLORIDE 600; 310; 30; 20 MG/100ML; MG/100ML; MG/100ML; MG/100ML
INJECTION, SOLUTION INTRAVENOUS CONTINUOUS
Status: DISCONTINUED | OUTPATIENT
Start: 2024-05-04 | End: 2024-05-06 | Stop reason: HOSPADM

## 2024-05-04 RX ORDER — METHYLERGONOVINE MALEATE 0.2 MG/ML
200 INJECTION INTRAVENOUS PRN
Status: DISCONTINUED | OUTPATIENT
Start: 2024-05-04 | End: 2024-05-05

## 2024-05-04 RX ORDER — TERBUTALINE SULFATE 1 MG/ML
0.25 INJECTION, SOLUTION SUBCUTANEOUS
Status: DISCONTINUED | OUTPATIENT
Start: 2024-05-04 | End: 2024-05-05

## 2024-05-04 RX ORDER — SODIUM CHLORIDE 0.9 % (FLUSH) 0.9 %
5-40 SYRINGE (ML) INJECTION PRN
Status: DISCONTINUED | OUTPATIENT
Start: 2024-05-04 | End: 2024-05-05

## 2024-05-04 RX ADMIN — SODIUM CHLORIDE, POTASSIUM CHLORIDE, SODIUM LACTATE AND CALCIUM CHLORIDE: 600; 310; 30; 20 INJECTION, SOLUTION INTRAVENOUS at 23:51

## 2024-05-04 RX ADMIN — DEXTROSE MONOHYDRATE 5 MILLION UNITS: 5 INJECTION INTRAVENOUS at 20:35

## 2024-05-04 RX ADMIN — Medication 2.5 MILLION UNITS: at 23:53

## 2024-05-04 RX ADMIN — SODIUM CHLORIDE, POTASSIUM CHLORIDE, SODIUM LACTATE AND CALCIUM CHLORIDE: 600; 310; 30; 20 INJECTION, SOLUTION INTRAVENOUS at 20:35

## 2024-05-05 LAB — REAGIN+T PALLIDUM IGG+IGM SERPL-IMP: NORMAL

## 2024-05-05 PROCEDURE — 0KQM0ZZ REPAIR PERINEUM MUSCLE, OPEN APPROACH: ICD-10-PCS | Performed by: ADVANCED PRACTICE MIDWIFE

## 2024-05-05 PROCEDURE — 6360000002 HC RX W HCPCS: Performed by: ADVANCED PRACTICE MIDWIFE

## 2024-05-05 PROCEDURE — 1220000000 HC SEMI PRIVATE OB R&B

## 2024-05-05 PROCEDURE — 10907ZC DRAINAGE OF AMNIOTIC FLUID, THERAPEUTIC FROM PRODUCTS OF CONCEPTION, VIA NATURAL OR ARTIFICIAL OPENING: ICD-10-PCS | Performed by: ADVANCED PRACTICE MIDWIFE

## 2024-05-05 PROCEDURE — 2500000003 HC RX 250 WO HCPCS

## 2024-05-05 PROCEDURE — 7200000001 HC VAGINAL DELIVERY

## 2024-05-05 PROCEDURE — 6370000000 HC RX 637 (ALT 250 FOR IP): Performed by: ADVANCED PRACTICE MIDWIFE

## 2024-05-05 RX ORDER — LIDOCAINE HYDROCHLORIDE 10 MG/ML
INJECTION, SOLUTION INFILTRATION; PERINEURAL
Status: COMPLETED
Start: 2024-05-05 | End: 2024-05-05

## 2024-05-05 RX ORDER — IBUPROFEN 800 MG/1
800 TABLET ORAL EVERY 8 HOURS SCHEDULED
Status: DISCONTINUED | OUTPATIENT
Start: 2024-05-05 | End: 2024-05-06 | Stop reason: HOSPADM

## 2024-05-05 RX ORDER — SODIUM CHLORIDE 0.9 % (FLUSH) 0.9 %
5-40 SYRINGE (ML) INJECTION PRN
Status: DISCONTINUED | OUTPATIENT
Start: 2024-05-05 | End: 2024-05-06 | Stop reason: HOSPADM

## 2024-05-05 RX ORDER — KETOROLAC TROMETHAMINE 30 MG/ML
30 INJECTION, SOLUTION INTRAMUSCULAR; INTRAVENOUS ONCE
Status: COMPLETED | OUTPATIENT
Start: 2024-05-05 | End: 2024-05-05

## 2024-05-05 RX ORDER — LANOLIN 100 %
OINTMENT (GRAM) TOPICAL PRN
Status: DISCONTINUED | OUTPATIENT
Start: 2024-05-05 | End: 2024-05-06 | Stop reason: HOSPADM

## 2024-05-05 RX ORDER — DOCUSATE SODIUM 100 MG/1
100 CAPSULE, LIQUID FILLED ORAL 2 TIMES DAILY
Status: DISCONTINUED | OUTPATIENT
Start: 2024-05-05 | End: 2024-05-06 | Stop reason: HOSPADM

## 2024-05-05 RX ORDER — SODIUM CHLORIDE 0.9 % (FLUSH) 0.9 %
5-40 SYRINGE (ML) INJECTION EVERY 12 HOURS SCHEDULED
Status: DISCONTINUED | OUTPATIENT
Start: 2024-05-05 | End: 2024-05-06 | Stop reason: HOSPADM

## 2024-05-05 RX ORDER — FERROUS SULFATE 325(65) MG
325 TABLET ORAL EVERY OTHER DAY
Status: DISCONTINUED | OUTPATIENT
Start: 2024-05-05 | End: 2024-05-06 | Stop reason: HOSPADM

## 2024-05-05 RX ORDER — KETOROLAC TROMETHAMINE 30 MG/ML
30 INJECTION, SOLUTION INTRAMUSCULAR; INTRAVENOUS ONCE
Status: DISCONTINUED | OUTPATIENT
Start: 2024-05-05 | End: 2024-05-05

## 2024-05-05 RX ORDER — SODIUM CHLORIDE 9 MG/ML
INJECTION, SOLUTION INTRAVENOUS PRN
Status: DISCONTINUED | OUTPATIENT
Start: 2024-05-05 | End: 2024-05-06 | Stop reason: HOSPADM

## 2024-05-05 RX ORDER — ACETAMINOPHEN 500 MG
1000 TABLET ORAL EVERY 8 HOURS SCHEDULED
Status: DISCONTINUED | OUTPATIENT
Start: 2024-05-05 | End: 2024-05-06 | Stop reason: HOSPADM

## 2024-05-05 RX ADMIN — IBUPROFEN 800 MG: 800 TABLET, FILM COATED ORAL at 13:32

## 2024-05-05 RX ADMIN — DOCUSATE SODIUM 100 MG: 100 CAPSULE, LIQUID FILLED ORAL at 21:07

## 2024-05-05 RX ADMIN — Medication 166.7 ML: at 01:32

## 2024-05-05 RX ADMIN — ACETAMINOPHEN 1000 MG: 500 TABLET ORAL at 08:24

## 2024-05-05 RX ADMIN — Medication 87.3 MILLI-UNITS/MIN: at 01:32

## 2024-05-05 RX ADMIN — DOCUSATE SODIUM 100 MG: 100 CAPSULE, LIQUID FILLED ORAL at 08:24

## 2024-05-05 RX ADMIN — KETOROLAC TROMETHAMINE 30 MG: 30 INJECTION, SOLUTION INTRAMUSCULAR at 02:24

## 2024-05-05 RX ADMIN — LIDOCAINE HYDROCHLORIDE 200 MG: 10 INJECTION, SOLUTION INFILTRATION; PERINEURAL at 01:50

## 2024-05-05 ASSESSMENT — PAIN SCALES - GENERAL: PAINLEVEL_OUTOF10: 3

## 2024-05-05 NOTE — L&D DELIVERY SUMMARY NOTE
[address] [address]                         LABOR AND DELIVERY NOTE      PATIENT NAME: ERLINDA RUSSELL                 : 1995  MED REC NO: [id number]                      ROOM: 0382  ACCOUNT NO: [id number]                       ADMIT DATE: 2024  PROVIDER: Ashly Elliott CNM      DATE OF PROCEDURE:      The patient is a 29-year-old,  2, para 1, at 39 weeks and 4 days' gestation who was admitted on 2024 in active labor at term.  Her pregnancy course was uncomplicated.  Her GBS status was positive, and she did receive 2 doses of antibiotics prior to delivery.  On admission, she was 6 to 7 cm dilated, 80% effaced, -2 station with a cephalic presentation.  Fetal heart tones were category 1 initially and then the patient desired intermittent fetal monitoring and the fetal heart rate remained reassuring.  She made good labor progress.  She did receive the 2 doses of antibiotics and then requested rupture of membranes.  Membranes were ruptured artificially, and the patient was 8 cm for thin meconium fluid.  She progressed very quickly to complete, pushed for approximately 15 minutes, and had a spontaneous vaginal delivery of a live born infant male.  The patient was in the all-fours position at delivery.  The cord was doubly clamped and cut after the infant was delivered, and the infant was placed skin to skin with the mother.  The baby established good color, tone, and cry with drying.  The cord blood was collected and the delivery of the placenta was then spontaneous.  The uterus contracted promptly, and the patient did receive oxytocin and IV fluids.  The perineum and vagina were inspected and found to have a second-degree laceration, which was repaired with 2-0 and 3-0 suture under local infiltration.  Mother, baby, and father entered into the postpartum period in excellent condition.  Estimated blood loss was

## 2024-05-05 NOTE — PROGRESS NOTES
Patient up for the first time with the help of 2 RNs. Assist x 2 provided; patient tolerated well. Linens were changed, pericare performed, panties applied and Pad changed. Patient voided without difficulty. Patient was helped back to bed without difficulty. Patient is pink and stable

## 2024-05-05 NOTE — L&D DELIVERY SUMMARY NOTE
Department of Obstetrics and Gynecology  Spontaneous Vaginal Delivery Note         Pre-operative Diagnosis:  Term pregnancy, Spontaneous labor, and Uncomplicated pregnancy    Post-operative Diagnosis:  Living  infant(s) and Male    Procedure:  Spontaneous vaginal delivery or Repair second degree spontaneous laceration    Surgeon:   Ashly Elliott CNM    Information for the patient's :  Miguel Camejo [[id number]]        Anesthesia:  none    Estimated blood loss:  200 cc    Specimen:  Placenta not sent to pathology     Cord blood sent Yes    Complications:  none    Condition:  infant stable and skin to skin with the mother and mother stable    Details of Procedure:   The patient is a 29 y.o. female at 39w5d   OB History          2    Para   1    Term   1            AB        Living   1         SAB        IAB        Ectopic        Molar        Multiple   0    Live Births   1             who was admitted for active phase labor. She received the following interventions: ARBOW with thin meconium fluid return.  She was known to be GBS positive and did receive antibiotic prophylaxis x 2 doses prior to delivery. The patient progressed well,did not receive an epidural, became complete and started to push. After pushing for 15 minutes the fetal head was at the perineum and the rest of the infant delivered atraumatically and was placed on the mother's abdomen.Cord was clamped and cut and infant placed skin to skin with the mother.  The delivery of the placenta was spontaneous. The perineum and vagina were explored and a second degree laceration was repaired in standard fashion under local infiltration. This mother plans to breastfeed her baby. Dr Zendejas was notified of this birth.

## 2024-05-05 NOTE — PROGRESS NOTES
Department of Obstetrics and Gynecology  Labor and Delivery  Attending Post Partum Progress Note      SUBJECTIVE:  Feels well, denies complaints. Voiding qs, tolerating regular diet, ambulating well. Minimal perineal discomfort. Baby is breast feeding well    OBJECTIVE:      Vitals:  /66   Pulse 73   Temp 98.1 °F (36.7 °C) (Oral)   Resp 16   Ht 1.651 m (5' 5\")   Wt 83.9 kg (185 lb)   SpO2 97%   Breastfeeding Unknown   BMI 30.79 kg/m²     ABDOMEN:  soft and non-tender, FF@U  GENITAL/URINARY:  MLR  LE: No evidence of DVT    DATA:    Pending for tomorrow    ASSESSMENT & PLAN:      Multip s/p vaginal delivery #DOD  Stable nursing male infant  Continue plan of care  Lactation support prn

## 2024-05-05 NOTE — PLAN OF CARE
Problem: Vaginal Birth or  Section  Goal: Fetal and maternal status remain reassuring during the birth process  Description:  Birth OB-Pregnancy care plan goal which identifies if the fetal and maternal status remain reassuring during the birth process  2024 0152 by Margo Alves, RN  Outcome: Completed  2024 by Margo Alves, RN  Outcome: Progressing

## 2024-05-05 NOTE — PROGRESS NOTES
Called GODFREY DevriesM to update that patient is in triage with chief complaint of contractions. SVE was 6-7/80/-2. Admit orders given at this time. CNM on the way to hospital.

## 2024-05-05 NOTE — H&P
Department of Obstetrics and Gynecology   Obstetrics History and Physical        CHIEF COMPLAINT:  Admitted w/ onset of labor. Reports onset of regular contractions this evening. Denies LOF. Admits to bloody show. Baby is active. Plans NCB.     HISTORY OF PRESENT ILLNESS:      The patient is a 29 y.o. female at 39.4 weeks  OB History          2    Para   1    Term   1            AB        Living   1         SAB        IAB        Ectopic        Molar        Multiple   0    Live Births   1            Patient presents with a chief complaint as above and is being admitted for active phase labor    Estimated Due Date: Estimated Date of Delivery: 2024    PRENATAL CARE:    Complicated by: None.  MaterniT-21 WNL XY, TSF=002. TWG=10#, Hx of anxiety due to job stress. 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      PAST OB HISTORY:  OB History          2    Para   1    Term   1            AB        Living   1         SAB        IAB        Ectopic        Molar        Multiple   0    Live Births   1                Past Medical History:        Diagnosis Date    Asthma     childhood     Past Surgical History:        Procedure Laterality Date    KNEE SURGERY Left     TONSILLECTOMY      WISDOM TOOTH EXTRACTION       Allergies:  No known allergies    Social History:    Social History     Socioeconomic History    Marital status:      Spouse name: Not on file    Number of children: Not on file    Years of education: Not on file    Highest education level: Not on file   Occupational History    Not on file   Tobacco Use    Smoking status: Never    Smokeless tobacco: Never   Vaping Use    Vaping Use: Never used   Substance and Sexual Activity    Alcohol use: Not Currently    Drug use: Never    Sexual activity: Yes     Partners: Male   Other Topics Concern    Not on file   Social History Narrative    Not on file     Social Determinants of Health     Financial Resource Strain: Not on file   Food

## 2024-05-05 NOTE — PROGRESS NOTES
Reports contractions are getting stronger. Breathing through contractions and ambulating.    with doppler, no decelerations noted.   Contractions q 2-4 minutes and palpate firm  Cx exam deferred till after second dose of antibiotics.   Anticipate vaginal delivery.

## 2024-05-05 NOTE — PROGRESS NOTES
Contractions are becoming stronger  Afebrile VSS  PCN #2 has infused   with doppler  Contractions q 2-3 minutes/palpate firm  Cx 7/100/-1 AROM for thin meconium return  Continue labor support  Anticipate vaginal delivery

## 2024-05-06 VITALS
WEIGHT: 185 LBS | RESPIRATION RATE: 16 BRPM | BODY MASS INDEX: 30.82 KG/M2 | OXYGEN SATURATION: 98 % | DIASTOLIC BLOOD PRESSURE: 70 MMHG | SYSTOLIC BLOOD PRESSURE: 116 MMHG | HEART RATE: 83 BPM | HEIGHT: 65 IN | TEMPERATURE: 98.2 F

## 2024-05-06 LAB
DEPRECATED RDW RBC AUTO: 15.4 % (ref 12.4–15.4)
HCT VFR BLD AUTO: 30.7 % (ref 36–48)
HGB BLD-MCNC: 10.5 G/DL (ref 12–16)
MCH RBC QN AUTO: 28 PG (ref 26–34)
MCHC RBC AUTO-ENTMCNC: 34.2 G/DL (ref 31–36)
MCV RBC AUTO: 81.9 FL (ref 80–100)
PLATELET # BLD AUTO: 170 K/UL (ref 135–450)
PMV BLD AUTO: 9.1 FL (ref 5–10.5)
RBC # BLD AUTO: 3.75 M/UL (ref 4–5.2)
WBC # BLD AUTO: 8.8 K/UL (ref 4–11)

## 2024-05-06 PROCEDURE — 36415 COLL VENOUS BLD VENIPUNCTURE: CPT

## 2024-05-06 PROCEDURE — 85027 COMPLETE CBC AUTOMATED: CPT

## 2024-05-06 NOTE — PROGRESS NOTES
Discharge Phone Call    Patient Name: ERLINDA Camejo     OB Care Provider: Kiersten Zendejas MD Discharge Date: 2024    Disposition of baby:    Phone Number: [phone] (home)     Attempts to Contact:  Date:    Caller  Date:    Caller  Date:    Caller    Information for the patient's :  Miguel Camejo [[id number]]   Delivery Method: Vaginal, Spontaneous     1.  Now that you are at home is your pain being well controlled?   Y/N   If no, instruct to call       provider.      2. Are you breastfeeding?    Y/N    Do you need any extra support from our lactation staff?      Y/N    If yes, provide number for lactation.  3. Have you made or already had your first appointment with the baby's doctor? Y/N   If no, do      you know when to schedule it?  Y/N    4. Have you scheduled your follow-up appointment?  Y/N  If no, do you know when to schedule       it?    Y/N   If no, they can find it on printed discharge instructions.  5. Did staff discuss safe sleep during your stay? Y/N   6. Did we explain things in a way you could understand?  Y/N  7. Were we respectful of your preferences for labor and birth and include you in the plan of       care?  Y/N  If no, please explain _______________________________________________  8. Is there anyone in particular you would like to mention who provided care for you? _______      _________________________________________________________________________     9. Were you given a Post-Birth Warning Signs handout?  Y/N  Do you have it somewhere      easily accessible?  Y/N  If no, please send them a copy and ask them to put it somewhere      easily found.  10. Have you been crying excessively, having anger or mood swings that feel out of control, or       feel like you can't cope with caring for yourself or baby? Y/N   If yes, they may be showing       signs of postpartum depression and should call provider. There is also a        depression test on page C5 in their discharge

## 2024-05-06 NOTE — PLAN OF CARE
Problem: Postpartum  Goal: Experiences normal postpartum course  Description:  Postpartum OB-Pregnancy care plan goal which identifies if the mother is experiencing a normal postpartum course  2024 by Sriram Hicks RN  Outcome: Progressing  2024 by Nicolette Mcintyre RN  Outcome: Progressing  Goal: Appropriate maternal -  bonding  Description:  Postpartum OB-Pregnancy care plan goal which identifies if the mother and  are bonding appropriately  2024 by Sriram Hicks RN  Outcome: Progressing  2024 by Nicolette Mcintyre RN  Outcome: Progressing  Goal: Establishment of infant feeding pattern  Description:  Postpartum OB-Pregnancy care plan goal which identifies if the mother is establishing a feeding pattern with their   2024 by Sriram Hicks RN  Outcome: Progressing  2024 by Nicolette Mcintyre RN  Outcome: Progressing  Goal: Incisions, wounds, or drain sites healing without S/S of infection  Outcome: Progressing     Problem: Pain  Goal: Verbalizes/displays adequate comfort level or baseline comfort level  2024 by Sriram Hicks RN  Outcome: Progressing  2024 by Nicolette Mcintyre RN  Outcome: Progressing     Problem: Infection - Adult  Goal: Absence of infection at discharge  Outcome: Progressing  Goal: Absence of infection during hospitalization  Outcome: Progressing  Goal: Absence of fever/infection during anticipated neutropenic period  Outcome: Progressing     Problem: Safety - Adult  Goal: Free from fall injury  2024 by Sriram Hicks RN  Outcome: Progressing  2024 by Nicolette Mcintyre RN  Outcome: Progressing     Problem: Chronic Conditions and Co-morbidities  Goal: Patient's chronic conditions and co-morbidity symptoms are monitored and maintained or improved  Outcome: Progressing

## 2024-05-06 NOTE — PROGRESS NOTES
Department of Obstetrics and Gynecology  Labor and Delivery  Attending Post Partum Progress Note      SUBJECTIVE:  Denies c/o. Tolerating a regular diet. Ambulating and voiding w/o difficulty. Bleeding WNL. Pain well controlled and not needing much medication. Breastfeeding infant. Desires d/c home today.     OBJECTIVE:      Vitals:  /77   Pulse 71   Temp 98.1 °F (36.7 °C) (Oral)   Resp 18   Ht 1.651 m (5' 5\")   Wt 83.9 kg (185 lb)   SpO2 98%   Breastfeeding Unknown   BMI 30.79 kg/m²     ABDOMEN:  FF @ u/1, midline, non-tender  GENITAL/URINARY:  well-approximated  LE: no evidence of DVT    DATA:    CBC:    Lab Results   Component Value Date/Time    WBC 8.8 2024 05:52 AM    RBC 3.75 2024 05:52 AM    HGB 10.5 2024 05:52 AM    HCT 30.7 2024 05:52 AM    MCV 81.9 2024 05:52 AM    RDW 15.4 2024 05:52 AM     2024 05:52 AM       ASSESSMENT & PLAN:      PPD 1 s/p   Meeting PP milestones  Mild anemia - continue PNV  Stable, male breastfeeding infant - plans circ  D/c home today  Declines rx for pain meds  Rev PP warning signs  Hx of PPD on meds x30 days - declines meds at this time  RTO 2 wks for mental health check-in

## 2024-05-06 NOTE — DISCHARGE SUMMARY
Obstetrical Discharge Form    Gestational Age: 39w5d    Antepartum complications: none    Date of Delivery: 24      Type of Delivery: vaginal, spontaneous    Delivered By: RIKY CROCKETT     Baby:      Information for the patient's :  Miguel Camejo [[id number]]   APGAR One: 8   Information for the patient's :  Miguel Camejo [[id number]]   APGAR Five: 9   Information for the patient's :  Miguel Camejo [[id number]]   Birth Weight: 3.598 kg (7 lb 14.9 oz)     Anesthesia: None    Intrapartum complications: GBS +, MSF    Postpartum complications: mild anemia    Discharge Medication:      Medication List        CONTINUE taking these medications      PRENATAL 1+1 PO            STOP taking these medications      benzocaine-menthol 20-0.5 % Aero spray  Commonly known as: DERMOPLAST     calcium carbonate 500 MG Tabs tablet  Commonly known as: OSCAL     docusate 100 MG Caps  Commonly known as: COLACE, DULCOLAX     hydrocortisone 2.5 % cream     ibuprofen 800 MG tablet  Commonly known as: ADVIL;MOTRIN     lanolin ointment              Discharge Condition:  good    Discharge Date: 24    PLAN:  RTO 2 wks for mental health check-in  Follow up in 6 weeks for routine PP visit  Reviewed PP warning signs  All questions answered  D/C summary begun at delivery for D/C planning purposes, any delay in discharge from ordered D/C date due to  factors.

## 2024-05-06 NOTE — DISCHARGE INSTRUCTIONS
Thank you for the opportunity to care for you and your family.    We hope that you are happy with the care we provided during your stay in the [facility]. We want to ensure that you have the help you need when you leave the hospital. If there is anything we can assist you with, please let us know.    Breastfeeding mothers may contact our lactation specialists with any problems or questions.  [facility] services phone number is [phone].  Leave a message and your call will be returned.    Please refer to the information provided in the postpartum care booklet.  The following are warning signs to remember.    Call 911 if you have:    Chest pain or pressure  Shortness of breath, even at rest  Thoughts of harming yourself or others  Seizures    Call your healthcare provider if you have:    Temperature of 100.4 degrees or higher  Stitches that are not healing        -- Swelling, bleeding, drainage, foul odor, redness or warmth in/around your           stitches, staples, or incision (scar)        -- Bad smelling blood or discharge from the vagina  Vaginal bleeding that has increased         -- Soaking through one pad in an hour        -- You are passing clots larger than the size of a lemon  Red, warm tender area(s) in your breast or calf  Headache that does not get better, even after taking medicine; or headache with vision changes    Remember to notify all healthcare providers from your date of delivery to up to one year after giving birth!    CARING FOR YOURSELF        DIET/ACTIVITY    Eat a well balanced diet focusing on foods high in fiber and protein.  Drink plenty of fluids, especially water.  To avoid constipation you may take a mild stool softener as recommended by your doctor or midwife.  Gradually increase your activity. Resume an exercise regime only after being advised by your doctor or midwife.  When sitting or lying down, keep your legs elevated to reduce swelling.  Avoid

## 2024-05-06 NOTE — PLAN OF CARE
Problem: Postpartum  Goal: Experiences normal postpartum course  Description:  Postpartum OB-Pregnancy care plan goal which identifies if the mother is experiencing a normal postpartum course  Outcome: Progressing  Goal: Appropriate maternal -  bonding  Description:  Postpartum OB-Pregnancy care plan goal which identifies if the mother and  are bonding appropriately  Outcome: Progressing  Goal: Establishment of infant feeding pattern  Description:  Postpartum OB-Pregnancy care plan goal which identifies if the mother is establishing a feeding pattern with their   Outcome: Progressing     Problem: Pain  Goal: Verbalizes/displays adequate comfort level or baseline comfort level  Outcome: Progressing     Problem: Safety - Adult  Goal: Free from fall injury  Outcome: Progressing

## 2024-05-06 NOTE — LACTATION NOTE
baby then detached from the breast. Nipple was rounded without compression or creasing and shown to mother as additional reassurance.     Manual Expression:  Expresses easily, Drops obtained, and MOB demonstrates well     Amount of milk expressed:   Drops    Pump Arrangements:  Owns breast pump Spectra S2 from first baby, has a [employer] wearable breast pump from insurance with this baby.    Education:  Latch & positioning , Feeding frequency & feeding cues , Exclusive breastfeeding , Discharge breastfeeding education , Breastfeeding Booklet reviewed , No artificial nipples , Risks of formula feeding , Expected intake and output , Feeding and diaper log , Skin to skin , Engorgement prevention & management , Nipple shields , Electric breast pump , Milk storage guidelines , Reverse pressure softening , Hand pump , Hand expression , [facility] , and [facility]           Action/Plan:  Breastfeed on cue and at least 8 times/24 hours, unlimited timing. May not nurse this often in the first 24 hours. Wake baby and offer breastfeeding if it has been 3 hours since the beginning of last feeding. Place infant skin to skin if infant will not breastfeed at 3 hours.   Hand express prior to latch to garfield nipple and entice infant to the breast.   It is important to use gentle stimulation during feeding to promote active eating. Offer both breasts at every feeding. Burp infant in between sides. Alternate which breast is used first.   Offer STS often while awake. Mother holding infant skin to skin between feedings will promote milk supply and allow infant to rest more deeply.   Educated on breast care, what to expect with  feeding behaviors, and how to know baby is getting enough from the breast including daily feeding, output, and weight trend goals.   Reviewed breast care, prevention/treatment of engorgement, what to monitor for as s/s of mastitis and when to seek 
    Glucose:  No      Intervention During Consultation    Interventions Performed:   [place] Breastfeeding Booklet Provided , Hand expression, and Education     Latch & Positioning: MOB report that she is comfortable with latching infant at this time, and has been using football hold. LC gave praise for all efforts, and encouraged to call LC back to room to observe next feeding.    Manual Expression:  MOB states she understands     Bedside Breast Pump:   N/A    Breast Shield Size:   N/A    Amount of milk expressed:   N/A    Pump Arrangements:  Owns breast pump    Education:  Latch & positioning , Feeding frequency & feeding cues , Exclusive breastfeeding , Expected intake and output , Feeding and diaper log , Skin to skin , [facility] , and [facility]     Action/Plan:  Breastfeed on cue and at least 8 times/24 hours, unlimited timing. May not nurse this often in the first 24 hours. Wake baby and offer breastfeeding if it has been 3 hours since the beginning of last feeding. Place infant skin to skin if infant will not breastfeed at 3 hours.   Hand express prior to latch to garfield nipple and entice infant to the breast.   It is important to use gentle stimulation during feeding to promote active eating. Offer both breasts at every feeding. Burp infant in between sides. Alternate which breast is used first.   Offer STS often while awake. Mother holding infant skin to skin between feedings will promote milk supply and allow infant to rest more deeply.   Maintain a feeding log until infant is gaining weight and seen by primary care physician.   Request breastfeeding assistance from LC or RN as needed.     Feeding Plan reviewed with: Parents     Response:   Verbalized understanding of education and instruction, Active in care, Demonstrates latch well , Bonding well , and Pleased